# Patient Record
Sex: FEMALE | Race: WHITE | Employment: UNEMPLOYED | URBAN - METROPOLITAN AREA
[De-identification: names, ages, dates, MRNs, and addresses within clinical notes are randomized per-mention and may not be internally consistent; named-entity substitution may affect disease eponyms.]

---

## 2017-08-18 ENCOUNTER — HOSPITAL ENCOUNTER (OUTPATIENT)
Dept: ULTRASOUND IMAGING | Age: 62
Discharge: HOME | End: 2017-08-18
Payer: COMMERCIAL

## 2017-08-18 DIAGNOSIS — R63.4 WEIGHT LOSS: ICD-10-CM

## 2017-08-18 DIAGNOSIS — R10.12 LUQ PAIN: ICD-10-CM

## 2017-08-18 PROCEDURE — 76705 ECHO EXAM OF ABDOMEN: CPT

## 2017-12-01 ENCOUNTER — HOSPITAL ENCOUNTER (OUTPATIENT)
Dept: CT IMAGING | Age: 62
Discharge: HOME | End: 2017-12-01
Payer: COMMERCIAL

## 2017-12-01 DIAGNOSIS — C73 THYROID CANCER (HCC): ICD-10-CM

## 2017-12-01 DIAGNOSIS — E03.9 HYPOTHYROIDISM, UNSPECIFIED TYPE: ICD-10-CM

## 2017-12-01 DIAGNOSIS — J32.9 CHRONIC SINUSITIS, UNSPECIFIED LOCATION: ICD-10-CM

## 2017-12-01 PROCEDURE — 70490 CT SOFT TISSUE NECK W/O DYE: CPT

## 2017-12-01 PROCEDURE — 70486 CT MAXILLOFACIAL W/O DYE: CPT

## 2017-12-19 ENCOUNTER — HOSPITAL ENCOUNTER (OUTPATIENT)
Dept: WOMENS IMAGING | Age: 62
Discharge: HOME | End: 2017-12-19
Payer: COMMERCIAL

## 2017-12-19 DIAGNOSIS — R92.8 ABNORMAL MAMMOGRAM: ICD-10-CM

## 2017-12-19 DIAGNOSIS — N64.4 PAIN OF RIGHT BREAST: ICD-10-CM

## 2017-12-19 DIAGNOSIS — N64.4 BREAST PAIN: ICD-10-CM

## 2017-12-19 PROCEDURE — 76642 ULTRASOUND BREAST LIMITED: CPT

## 2017-12-19 PROCEDURE — G0279 TOMOSYNTHESIS, MAMMO: HCPCS

## 2017-12-26 ENCOUNTER — HOSPITAL ENCOUNTER (OUTPATIENT)
Dept: WOMENS IMAGING | Age: 62
Discharge: HOME | End: 2017-12-26
Payer: COMMERCIAL

## 2017-12-26 VITALS — SYSTOLIC BLOOD PRESSURE: 113 MMHG | HEART RATE: 70 BPM | DIASTOLIC BLOOD PRESSURE: 70 MMHG

## 2017-12-26 DIAGNOSIS — R92.8 ABNORMAL MAMMOGRAM: ICD-10-CM

## 2017-12-26 PROCEDURE — 19083 BX BREAST 1ST LESION US IMAG: CPT

## 2017-12-26 PROCEDURE — 88305 TISSUE EXAM BY PATHOLOGIST: CPT

## 2017-12-27 LAB — SURGICAL PATHOLOGY REPORT: NORMAL

## 2019-03-04 ENCOUNTER — HOSPITAL ENCOUNTER (EMERGENCY)
Age: 64
Discharge: HOME | End: 2019-03-05
Attending: EMERGENCY MEDICINE
Payer: COMMERCIAL

## 2019-03-04 VITALS
HEART RATE: 74 BPM | SYSTOLIC BLOOD PRESSURE: 152 MMHG | OXYGEN SATURATION: 99 % | HEIGHT: 67 IN | TEMPERATURE: 97.9 F | RESPIRATION RATE: 16 BRPM | WEIGHT: 130 LBS | DIASTOLIC BLOOD PRESSURE: 62 MMHG | BODY MASS INDEX: 20.4 KG/M2

## 2019-03-04 DIAGNOSIS — L03.116 CELLULITIS OF LEFT LOWER EXTREMITY: Primary | ICD-10-CM

## 2019-03-04 PROCEDURE — 87040 BLOOD CULTURE FOR BACTERIA: CPT

## 2019-03-04 PROCEDURE — 85025 COMPLETE CBC W/AUTO DIFF WBC: CPT

## 2019-03-04 PROCEDURE — 6370000000 HC RX 637 (ALT 250 FOR IP): Performed by: EMERGENCY MEDICINE

## 2019-03-04 PROCEDURE — 93971 EXTREMITY STUDY: CPT

## 2019-03-04 PROCEDURE — 99283 EMERGENCY DEPT VISIT LOW MDM: CPT

## 2019-03-04 PROCEDURE — 86140 C-REACTIVE PROTEIN: CPT

## 2019-03-04 RX ORDER — KETOROLAC TROMETHAMINE 30 MG/ML
15 INJECTION, SOLUTION INTRAMUSCULAR; INTRAVENOUS ONCE
Status: DISCONTINUED | OUTPATIENT
Start: 2019-03-04 | End: 2019-03-05 | Stop reason: HOSPADM

## 2019-03-04 RX ORDER — IBUPROFEN 800 MG/1
800 TABLET ORAL ONCE
Status: COMPLETED | OUTPATIENT
Start: 2019-03-04 | End: 2019-03-04

## 2019-03-04 RX ADMIN — IBUPROFEN 800 MG: 800 TABLET ORAL at 23:05

## 2019-03-04 ASSESSMENT — PAIN DESCRIPTION - PAIN TYPE: TYPE: ACUTE PAIN

## 2019-03-04 ASSESSMENT — PAIN DESCRIPTION - LOCATION: LOCATION: ANKLE;FOOT;LEG

## 2019-03-04 ASSESSMENT — PAIN DESCRIPTION - DESCRIPTORS: DESCRIPTORS: ACHING;BURNING

## 2019-03-04 ASSESSMENT — PAIN SCALES - GENERAL
PAINLEVEL_OUTOF10: 5
PAINLEVEL_OUTOF10: 5

## 2019-03-05 LAB
ABSOLUTE EOS #: 0.1 K/UL (ref 0–0.4)
ABSOLUTE IMMATURE GRANULOCYTE: ABNORMAL K/UL (ref 0–0.3)
ABSOLUTE LYMPH #: 0.6 K/UL (ref 1–4.8)
ABSOLUTE MONO #: 0.4 K/UL (ref 0.1–1.3)
BASOPHILS # BLD: 0 % (ref 0–2)
BASOPHILS ABSOLUTE: 0 K/UL (ref 0–0.2)
C-REACTIVE PROTEIN: 15.2 MG/L (ref 0–5)
DIFFERENTIAL TYPE: ABNORMAL
EOSINOPHILS RELATIVE PERCENT: 2 % (ref 0–4)
HCT VFR BLD CALC: 39.1 % (ref 36–46)
HEMOGLOBIN: 13.4 G/DL (ref 12–16)
IMMATURE GRANULOCYTES: ABNORMAL %
LYMPHOCYTES # BLD: 15 % (ref 24–44)
MCH RBC QN AUTO: 31.1 PG (ref 26–34)
MCHC RBC AUTO-ENTMCNC: 34.2 G/DL (ref 31–37)
MCV RBC AUTO: 90.9 FL (ref 80–100)
MONOCYTES # BLD: 10 % (ref 1–7)
NRBC AUTOMATED: ABNORMAL PER 100 WBC
PDW BLD-RTO: 13 % (ref 11.5–14.9)
PLATELET # BLD: 145 K/UL (ref 150–450)
PLATELET ESTIMATE: ABNORMAL
PMV BLD AUTO: 10.7 FL (ref 6–12)
RBC # BLD: 4.3 M/UL (ref 4–5.2)
RBC # BLD: ABNORMAL 10*6/UL
SEG NEUTROPHILS: 73 % (ref 36–66)
SEGMENTED NEUTROPHILS ABSOLUTE COUNT: 3 K/UL (ref 1.3–9.1)
WBC # BLD: 4.2 K/UL (ref 3.5–11)
WBC # BLD: ABNORMAL 10*3/UL

## 2019-03-05 RX ORDER — LEVOTHYROXINE SODIUM 112 UG/1
112 TABLET ORAL DAILY
COMMUNITY
End: 2019-06-21 | Stop reason: ALTCHOICE

## 2019-03-05 RX ORDER — CEPHALEXIN 500 MG/1
500 CAPSULE ORAL 4 TIMES DAILY
Qty: 28 CAPSULE | Refills: 0 | Status: SHIPPED | OUTPATIENT
Start: 2019-03-05 | End: 2019-03-12

## 2019-03-05 ASSESSMENT — ENCOUNTER SYMPTOMS
VOMITING: 0
EYE PAIN: 0
PHOTOPHOBIA: 0
NAUSEA: 0
ABDOMINAL PAIN: 0
DIARRHEA: 0
COUGH: 0
RHINORRHEA: 0
EYE DISCHARGE: 0
COLOR CHANGE: 1
SHORTNESS OF BREATH: 0

## 2019-03-05 ASSESSMENT — PAIN SCALES - GENERAL: PAINLEVEL_OUTOF10: 3

## 2019-03-11 LAB
CULTURE: NORMAL
Lab: NORMAL
SPECIMEN DESCRIPTION: NORMAL

## 2019-03-13 ENCOUNTER — HOSPITAL ENCOUNTER (OUTPATIENT)
Dept: GENERAL RADIOLOGY | Age: 64
Discharge: HOME | End: 2019-03-15
Payer: COMMERCIAL

## 2019-03-13 ENCOUNTER — HOSPITAL ENCOUNTER (OUTPATIENT)
Age: 64
Discharge: HOME | End: 2019-03-15
Payer: COMMERCIAL

## 2019-03-13 DIAGNOSIS — R52 PAIN: ICD-10-CM

## 2019-03-13 PROCEDURE — 73630 X-RAY EXAM OF FOOT: CPT

## 2019-03-13 PROCEDURE — 73610 X-RAY EXAM OF ANKLE: CPT

## 2019-06-21 ENCOUNTER — INITIAL CONSULT (OUTPATIENT)
Dept: ONCOLOGY | Age: 64
End: 2019-06-21
Payer: COMMERCIAL

## 2019-06-21 ENCOUNTER — TELEPHONE (OUTPATIENT)
Dept: ONCOLOGY | Age: 64
End: 2019-06-21

## 2019-06-21 VITALS
HEIGHT: 66 IN | SYSTOLIC BLOOD PRESSURE: 135 MMHG | HEART RATE: 62 BPM | TEMPERATURE: 98 F | DIASTOLIC BLOOD PRESSURE: 75 MMHG | WEIGHT: 132.9 LBS | BODY MASS INDEX: 21.36 KG/M2

## 2019-06-21 DIAGNOSIS — D72.810 LYMPHOPENIA: Primary | ICD-10-CM

## 2019-06-21 DIAGNOSIS — R59.1 LYMPHADENOPATHY: ICD-10-CM

## 2019-06-21 PROCEDURE — 99245 OFF/OP CONSLTJ NEW/EST HI 55: CPT | Performed by: INTERNAL MEDICINE

## 2019-06-21 PROCEDURE — 99201 HC NEW PT, E/M LEVEL 1: CPT | Performed by: INTERNAL MEDICINE

## 2019-06-21 RX ORDER — LEVOTHYROXINE SODIUM 112 UG/1
CAPSULE ORAL DAILY
COMMUNITY

## 2019-06-21 SDOH — HEALTH STABILITY: MENTAL HEALTH: HOW OFTEN DO YOU HAVE A DRINK CONTAINING ALCOHOL?: NEVER

## 2019-06-21 NOTE — PROGRESS NOTES
fever or chills. · Eyes: No blurred vision, eye pain or double vision. · Ears: No hearing problems or drainage. No tinnitus. · Throat: No sore throat, problems with swallowing or dysphagia. · Respiratory: No cough, sputum or hemoptysis. No shortness of breath. No pleuritic chest pain. · Cardiovascular: No chest pain, orthopnea or PND. No lower extremity edema. No palpitation. · Gastrointestinal: No problems with swallowing. No abdominal pain or bloating. No nausea or vomiting. No diarrhea or constipation. No GI bleeding. · Genitourinary: No dysuria, hematuria, frequency or urgency. · Musculoskeletal: No muscle aches or pains. No limitation of movement. No back pain. No gait disturbance, No joint complaints. · Dermatologic: No skin rashes or pruritus. No skin lesions or discolorations. · Psychiatric: No depression, anxiety, or stress or signs of schizophrenia. No change in mood or affect. · Hematologic: No history of bleeding tendency. No bruises or ecchymosis. No history of clotting problems. · Infectious disease: No fever, chills or frequent infections. · Endocrine:  No polydipsia or polyuria. No temperature intolerance. · Neurologic: No headaches or dizziness. No weakness or numbness of the extremities. No changes in balance, coordination,  memory, mentation, behavior. · Allergic/Immunologic: No nasal congestion or hives. No repeated infections. PHYSICAL EXAM:  The patient is not in acute distress. Vital signs: Blood pressure 135/75, pulse 62, temperature 98 °F (36.7 °C), temperature source Oral, height 5' 6\" (1.676 m), weight 132 lb 14.4 oz (60.3 kg). HEENT:  Eyes are normal. Ears, nose and throat are normal.  Neck: Supple. No lymph node enlargement. Status post thyroidectomy. .  Trachea is centrally located. Chest:  Clear to auscultation. No wheezes or crepitations. Heart: Regular sinus rhythm. Abdomen: Mild tenderness in the left inguinal area. Site of biopsy.   No palpable lymph nodes. No hepatosplenomegaly. No masses. Extremities:  With no edema. Lymph Nodes:  No cervical, axillary or inguinal lymph node enlargement. Neurologic:  Conscious and oriented. No focal neurological deficits. Psychosocial: No depression, anxiety or stress. Skin: No rashes, bruises or ecchymoses. Review of Diagnostic data:   Lab Results   Component Value Date    WBC 4.2 03/04/2019    HGB 13.4 03/04/2019    HCT 39.1 03/04/2019    MCV 90.9 03/04/2019     (L) 03/04/2019         IMPRESSION:   Mild leukopenia. History of left inguinal lymphadenopathy. Status post biopsy. Negative for malignancy. Thyroid cancer status post thyroidectomy. In remission. PLAN: I reviewed the labs available to me and discussed with the patient. For more than 60 minutes of face to face discussion, I explained to the patient the nature of this hematologic problem. I explained the significance of these abnormalities in layman language. I reviewed labs and recent pathology results from the lymph node biopsy. Explained to the patient. No evidence of malignancy. However needle biopsy is not conclusive. I explained to the patient that excisional biopsy or incisional biopsy of lymph node will be more informative if malignancy is suspected. However she could be having reactive lymph node enlargements. Before making further recommendations we would like to do CT scan of the abdomen and pelvis for evaluation of possible lymphadenopathy. If suspicious patient would be referred to surgery for lymph node excisional or incisional biopsy. At the same time I will check LDH and flow cytometry and will repeat CBC today. We will make further recommendations based on the results. Patient's questions were answered to the best of her satisfaction and she verbalized full understanding and agreement.

## 2019-06-21 NOTE — TELEPHONE ENCOUNTER
Dr Denise Carvajal requested writer to obtain LN biopsy result performed earlier this week @ [facility]. Writer called [facility] path dept and spoke with Vaishali. Vaishali faxed report. Copy of report given to Dr Denise Carvajal to address with patient at appt today and other copy placed in pile to be scanned.  Navin Hargrove

## 2019-06-21 NOTE — LETTER
_    Pam Escalona MD    2019     Dorothy Vogel MD  [id number]    Dear Dr Darron Robles: Thank you for referring Mena Sykes, 1955, to me for evaluation. Below are the relevant portions of my assessment and plan of care. Ms. Gaby Henao is a very pleasant 61 y.o. female with history of multiple comorbidities as listed. Patient had recent diagnosis of thyroid cancer. Status post thyroidectomy. He follows up with endocrinology. Patient had pain in the left groin region for about 1 year. She started having swelling in the left lower extremity in 2019. She had Doppler ultrasound of the left leg which showed no DVT. She had left inguinal lymph node enlargement. Patient had recent needle biopsy performed last week. Negative for malignancy. Patient denies any fever or night sweating. No other palpable lymph nodes. No weight loss or decreased appetite. Lab testing showed mild leukopenia. Patient was referred for evaluation. No history of smoking or alcohol drinking. PAST MEDICAL HISTORY: has a past medical history of Mold exposure and Thyroid cancer ([facility] [address] PAST SURGICAL HISTORY: has a past surgical history that includes Cholecystectomy; Total Thyroidectomy (); Tonsillectomy and adenoidectomy;  section; knee surgery (Left, ); Hysterectomy, total abdominal (); and bladder suspension (). CURRENT MEDICATIONS:  has a current medication list which includes the following prescription(s): levothyroxine sodium, liotrix (t3-t4), and vitamin d. ALLERGIES:  is allergic to latex; shellfish allergy; sulfa antibiotics; codeine; erythromycin; and penicillins. FAMILY HISTORY: Mother had non-Hodgkin's lymphoma. Sister had cancer in the sternum. Otherwise negative for any hematological or oncological conditions. SOCIAL HISTORY:  reports that she has never smoked.  She has never used smokeless tobacco. She reports that she does not drink alcohol or use drugs. REVIEW OF SYSTEMS:     · General: Positive for weakness and fatigue. . No unanticipated weight loss or decreased appetite. No fever or chills. · Eyes: No blurred vision, eye pain or double vision. · Ears: No hearing problems or drainage. No tinnitus. · Throat: No sore throat, problems with swallowing or dysphagia. · Respiratory: No cough, sputum or hemoptysis. No shortness of breath. No pleuritic chest pain. · Cardiovascular: No chest pain, orthopnea or PND. No lower extremity edema. No palpitation. · Gastrointestinal: No problems with swallowing. No abdominal pain or bloating. No nausea or vomiting. No diarrhea or constipation. No GI bleeding. · Genitourinary: No dysuria, hematuria, frequency or urgency. · Musculoskeletal: No muscle aches or pains. No limitation of movement. No back pain. No gait disturbance, No joint complaints. · Dermatologic: No skin rashes or pruritus. No skin lesions or discolorations. · Psychiatric: No depression, anxiety, or stress or signs of schizophrenia. No change in mood or affect. · Hematologic: No history of bleeding tendency. No bruises or ecchymosis. No history of clotting problems. · Infectious disease: No fever, chills or frequent infections. · Endocrine:  No polydipsia or polyuria. No temperature intolerance. · Neurologic: No headaches or dizziness. No weakness or numbness of the extremities. No changes in balance, coordination,  memory, mentation, behavior. · Allergic/Immunologic: No nasal congestion or hives. No repeated infections. PHYSICAL EXAM:  The patient is not in acute distress. Vital signs: Blood pressure 135/75, pulse 62, temperature 98 °F (36.7 °C), temperature source Oral, height 5' 6\" (1.676 m), weight 132 lb 14.4 oz (60.3 kg). HEENT:  Eyes are normal. Ears, nose and throat are normal.  Neck: Supple.   No based on the results. Patient's questions were answered to the best of her satisfaction and she verbalized full understanding and agreement. If you have questions, please do not hesitate to call me. I look forward to following Tem[facility] along with you. Sincerely,    Pablito Lim MD  Cell: [phone]    This note is created with the assistance of a speech recognition program.  While intending to generate a document that actually reflects the content of the visit, the document can still have some errors including those of syntax and sound a like substitutions which may escape proof reading. It such instances, actual meaning can be extrapolated by contextual diversion.

## 2019-06-27 ENCOUNTER — HOSPITAL ENCOUNTER (OUTPATIENT)
Dept: CT IMAGING | Age: 64
Discharge: HOME | End: 2019-06-29
Payer: COMMERCIAL

## 2019-06-27 ENCOUNTER — HOSPITAL ENCOUNTER (OUTPATIENT)
Age: 64
Discharge: HOME | End: 2019-06-27
Payer: COMMERCIAL

## 2019-06-27 DIAGNOSIS — D72.810 LYMPHOPENIA: ICD-10-CM

## 2019-06-27 DIAGNOSIS — R59.1 LYMPHADENOPATHY: ICD-10-CM

## 2019-06-27 LAB
ABSOLUTE EOS #: 0.1 K/UL (ref 0–0.4)
ABSOLUTE IMMATURE GRANULOCYTE: ABNORMAL K/UL (ref 0–0.3)
ABSOLUTE LYMPH #: 1 K/UL (ref 1–4.8)
ABSOLUTE MONO #: 0.4 K/UL (ref 0.1–1.3)
BASOPHILS # BLD: 0 % (ref 0–2)
BASOPHILS ABSOLUTE: 0 K/UL (ref 0–0.2)
DIFFERENTIAL TYPE: ABNORMAL
EOSINOPHILS RELATIVE PERCENT: 3 % (ref 0–4)
HCT VFR BLD CALC: 38.6 % (ref 36–46)
HEMOGLOBIN: 13 G/DL (ref 12–16)
IMMATURE GRANULOCYTES: ABNORMAL %
LACTATE DEHYDROGENASE: 203 U/L (ref 135–214)
LYMPHOCYTES # BLD: 24 % (ref 24–44)
MCH RBC QN AUTO: 30.8 PG (ref 26–34)
MCHC RBC AUTO-ENTMCNC: 33.7 G/DL (ref 31–37)
MCV RBC AUTO: 91.2 FL (ref 80–100)
MONOCYTES # BLD: 10 % (ref 1–7)
NRBC AUTOMATED: ABNORMAL PER 100 WBC
PDW BLD-RTO: 13.6 % (ref 11.5–14.9)
PLATELET # BLD: 150 K/UL (ref 150–450)
PLATELET ESTIMATE: ABNORMAL
PMV BLD AUTO: 9.9 FL (ref 6–12)
RBC # BLD: 4.24 M/UL (ref 4–5.2)
RBC # BLD: ABNORMAL 10*6/UL
SEG NEUTROPHILS: 63 % (ref 36–66)
SEGMENTED NEUTROPHILS ABSOLUTE COUNT: 2.6 K/UL (ref 1.3–9.1)
WBC # BLD: 4.1 K/UL (ref 3.5–11)
WBC # BLD: ABNORMAL 10*3/UL

## 2019-06-27 PROCEDURE — 88185 FLOWCYTOMETRY/TC ADD-ON: CPT

## 2019-06-27 PROCEDURE — 88184 FLOWCYTOMETRY/ TC 1 MARKER: CPT

## 2019-06-27 PROCEDURE — 83615 LACTATE (LD) (LDH) ENZYME: CPT

## 2019-06-27 PROCEDURE — 74176 CT ABD & PELVIS W/O CONTRAST: CPT

## 2019-06-27 PROCEDURE — 85025 COMPLETE CBC W/AUTO DIFF WBC: CPT

## 2019-06-27 PROCEDURE — 36415 COLL VENOUS BLD VENIPUNCTURE: CPT

## 2019-06-30 LAB — SURGICAL PATHOLOGY REPORT: NORMAL

## 2019-07-01 ENCOUNTER — TELEPHONE (OUTPATIENT)
Dept: ONCOLOGY | Age: 64
End: 2019-07-01

## 2019-07-01 ENCOUNTER — OFFICE VISIT (OUTPATIENT)
Dept: ONCOLOGY | Age: 64
End: 2019-07-01
Payer: COMMERCIAL

## 2019-07-01 VITALS
SYSTOLIC BLOOD PRESSURE: 137 MMHG | TEMPERATURE: 98.6 F | DIASTOLIC BLOOD PRESSURE: 84 MMHG | WEIGHT: 132.4 LBS | HEART RATE: 63 BPM | BODY MASS INDEX: 21.37 KG/M2

## 2019-07-01 DIAGNOSIS — R59.1 LYMPHADENOPATHY: Primary | ICD-10-CM

## 2019-07-01 DIAGNOSIS — D72.810 LYMPHOPENIA: ICD-10-CM

## 2019-07-01 LAB — FLOW CYTOMETRY BL: NORMAL

## 2019-07-01 PROCEDURE — 99214 OFFICE O/P EST MOD 30 MIN: CPT | Performed by: INTERNAL MEDICINE

## 2019-07-01 PROCEDURE — 99211 OFF/OP EST MAY X REQ PHY/QHP: CPT

## 2019-07-01 NOTE — PROGRESS NOTES
_           Chief Complaint   Patient presents with    Follow-up     Review status of disease     Results     CT  / Labs      DIAGNOSIS:       Mild leukopenia. Resolved  History of left inguinal lymphadenopathy. Status post biopsy. Negative for malignancy. Resolved  Thyroid cancer status post thyroidectomy. In remission. CURRENT THERAPY:         Patient seen discussed results of the labs and CT scan and further management plans  BRIEF CASE HISTORY:      Ms. Ramandeep Santos is a very pleasant 61 y.o. female with history of multiple comorbidities as listed. Patient had recent diagnosis of thyroid cancer. Status post thyroidectomy. He follows up with endocrinology. Patient had pain in the left groin region for about 1 year. She started having swelling in the left lower extremity in 2019. She had Doppler ultrasound of the left leg which showed no DVT. She had left inguinal lymph node enlargement. Patient had recent needle biopsy performed last week. Negative for malignancy. Patient denies any fever or night sweating. No other palpable lymph nodes. No weight loss or decreased appetite. Lab testing showed mild leukopenia. Patient was referred for evaluation. No history of smoking or alcohol drinking. INTERIM HISTORY:   The patient is seen for follow-up lab tests and CT scan for a variation of left inguinal lymphadenopathy. Clinically stable. No new events. No fever or night sweats. No enlarged lymph nodes. PAST MEDICAL HISTORY: has a past medical history of Mold exposure and Thyroid cancer ([facility] [address] PAST SURGICAL HISTORY: has a past surgical history that includes Cholecystectomy; Total Thyroidectomy (); Tonsillectomy and adenoidectomy;  section; knee surgery (Left, ); Hysterectomy, total abdominal (); and bladder suspension ().      CURRENT MEDICATIONS:  has a current medication list which includes the following prescription(s): levothyroxine sodium, liotrix (t3-t4), and vitamin d. ALLERGIES:  is allergic to latex; shellfish allergy; sulfa antibiotics; codeine; erythromycin; and penicillins. FAMILY HISTORY: Mother had non-Hodgkin's lymphoma. Sister had cancer in the sternum. Otherwise negative for any hematological or oncological conditions. SOCIAL HISTORY:  reports that she has never smoked. She has never used smokeless tobacco. She reports that she does not drink alcohol or use drugs. REVIEW OF SYSTEMS:     · General: Positive for weakness and fatigue. . No unanticipated weight loss or decreased appetite. No fever or chills. · Eyes: No blurred vision, eye pain or double vision. · Ears: No hearing problems or drainage. No tinnitus. · Throat: No sore throat, problems with swallowing or dysphagia. · Respiratory: No cough, sputum or hemoptysis. No shortness of breath. No pleuritic chest pain. · Cardiovascular: No chest pain, orthopnea or PND. No lower extremity edema. No palpitation. · Gastrointestinal: No problems with swallowing. No abdominal pain or bloating. No nausea or vomiting. No diarrhea or constipation. No GI bleeding. · Genitourinary: No dysuria, hematuria, frequency or urgency. · Musculoskeletal: No muscle aches or pains. No limitation of movement. No back pain. No gait disturbance, No joint complaints. · Dermatologic: No skin rashes or pruritus. No skin lesions or discolorations. · Psychiatric: No depression, anxiety, or stress or signs of schizophrenia. No change in mood or affect. · Hematologic: No history of bleeding tendency. No bruises or ecchymosis. No history of clotting problems. · Infectious disease: No fever, chills or frequent infections. · Endocrine:  No polydipsia or polyuria. No temperature intolerance. · Neurologic: No headaches or dizziness. No weakness or numbness of the extremities.  No changes in

## 2020-03-13 ENCOUNTER — TELEPHONE (OUTPATIENT)
Dept: GASTROENTEROLOGY | Age: 65
End: 2020-03-13

## 2023-01-19 ENCOUNTER — TRANSCRIBE ORDERS (OUTPATIENT)
Dept: ADMINISTRATIVE | Age: 68
End: 2023-01-19

## 2023-01-19 DIAGNOSIS — R31.0 GROSS HEMATURIA: Primary | ICD-10-CM

## 2023-01-20 ENCOUNTER — HOSPITAL ENCOUNTER (OUTPATIENT)
Dept: CT IMAGING | Age: 68
Discharge: HOME | End: 2023-01-20
Payer: COMMERCIAL

## 2023-01-20 DIAGNOSIS — R31.0 GROSS HEMATURIA: ICD-10-CM

## 2023-01-20 PROCEDURE — 74176 CT ABD & PELVIS W/O CONTRAST: CPT

## 2023-01-26 ENCOUNTER — TELEPHONE (OUTPATIENT)
Dept: ONCOLOGY | Age: 68
End: 2023-01-26

## 2023-01-26 NOTE — TELEPHONE ENCOUNTER
PATIENT CALLED AND LEFT A VM WANTING TO GET SCHEDULED WITH DR. Kisha Vuong AT [facility], AS SHE CAN NOT GET AN APPOINTMENT AT [facility] UNTIL February. WRITER TRIED CALLING PATIENT BACK, BUT HAD TO LEAVE A VM THAT WE HAVE AN OPENING February 7TH, 2023.

## 2023-02-06 ENCOUNTER — OFFICE VISIT (OUTPATIENT)
Dept: ONCOLOGY | Age: 68
End: 2023-02-06
Payer: COMMERCIAL

## 2023-02-06 VITALS
TEMPERATURE: 97 F | HEART RATE: 76 BPM | BODY MASS INDEX: 23.98 KG/M2 | SYSTOLIC BLOOD PRESSURE: 134 MMHG | WEIGHT: 148.6 LBS | DIASTOLIC BLOOD PRESSURE: 73 MMHG

## 2023-02-06 DIAGNOSIS — R59.1 LYMPHADENOPATHY: ICD-10-CM

## 2023-02-06 DIAGNOSIS — D70.9 NEUTROPENIA, UNSPECIFIED TYPE (HCC): ICD-10-CM

## 2023-02-06 DIAGNOSIS — R31.0 GROSS HEMATURIA: Primary | ICD-10-CM

## 2023-02-06 PROCEDURE — 99205 OFFICE O/P NEW HI 60 MIN: CPT | Performed by: INTERNAL MEDICINE

## 2023-02-06 NOTE — PROGRESS NOTES
_           Chief Complaint   Patient presents with    Follow-up     Review status of disease    Other     Have been finding blood in my urine for over a week and went to urology and not comfortable with that MD no one has done blood work, My Primary care physician  has not been in the office due to emergency so I haven't seen anyone for help     Pain     Abdomen feels like theres a lot of pulling      DIAGNOSIS:       Mild leukopenia. Resolved  History of left inguinal lymphadenopathy. Status post biopsy. Negative for malignancy. Resolved  Thyroid cancer status post thyroidectomy. In remission. Recent episode of gross hematuria     CURRENT THERAPY:         Patient seen discussed results of the labs and CT scan and further management plans  Further management for hematuria by urology. BRIEF CASE HISTORY:      Ms. Chayito Michael is a very pleasant 79 y.o. female with history of multiple comorbidities as listed. Patient had recent diagnosis of thyroid cancer. Status post thyroidectomy. He follows up with endocrinology. Patient had pain in the left groin region for about 1 year. She started having swelling in the left lower extremity in March 2019. She had Doppler ultrasound of the left leg which showed no DVT. She had left inguinal lymph node enlargement. Patient had recent needle biopsy performed last week. Negative for malignancy. Patient denies any fever or night sweating. No other palpable lymph nodes. No weight loss or decreased appetite. Lab testing showed mild leukopenia. Patient was referred for evaluation. No history of smoking or alcohol drinking. INTERIM HISTORY:   Patient was last seen in our office in July 2019. At that time she was seen because of leukopenia and inguinal lymphadenopathy which has resolved.   Patient did not have any problems related to lymph nodes or vital signs or any signs or symptoms of fever or infections. No lymphadenopathy. No weight loss or decreased appetite. Patient had recent episode of gross hematuria. She was evaluated by her PCP and subsequently by urology. Patient is scheduled for procedure, likely cystoscopy. Patient has no abdominal pain. No pain of the flanks. No fever. No vomiting. No other complaints. PAST MEDICAL HISTORY: has a past medical history of Mold exposure and Thyroid cancer ([facility] [address] PAST SURGICAL HISTORY: has a past surgical history that includes Cholecystectomy; Total Thyroidectomy (); Tonsillectomy and adenoidectomy;  section; knee surgery (Left, ); Hysterectomy, total abdominal (); and bladder suspension (). CURRENT MEDICATIONS:  has a current medication list which includes the following prescription(s): levothyroxine sodium, liotrix (t3-t4), and vitamin d. ALLERGIES:  is allergic to latex, shellfish allergy, sulfa antibiotics, codeine, erythromycin, and penicillins. FAMILY HISTORY: Mother had non-Hodgkin's lymphoma. Sister had cancer in the sternum. Otherwise negative for any hematological or oncological conditions. SOCIAL HISTORY:  reports that she has never smoked. She has never used smokeless tobacco. She reports that she does not drink alcohol and does not use drugs. REVIEW OF SYSTEMS:     General: Positive for weakness and fatigue. . No unanticipated weight loss or decreased appetite. No fever or chills. Eyes: No blurred vision, eye pain or double vision. Ears: No hearing problems or drainage. No tinnitus. Throat: No sore throat, problems with swallowing or dysphagia. Respiratory: No cough, sputum or hemoptysis. No shortness of breath. No pleuritic chest pain. Cardiovascular: No chest pain, orthopnea or PND. No lower extremity edema. No palpitation. Gastrointestinal: No problems with swallowing. No abdominal pain or bloating. No nausea or vomiting.  No diarrhea or constipation. No GI bleeding. Genitourinary: As above. Musculoskeletal: No muscle aches or pains. No limitation of movement. No back pain. No gait disturbance, No joint complaints. Dermatologic: No skin rashes or pruritus. No skin lesions or discolorations. Psychiatric: No depression, anxiety, or stress or signs of schizophrenia. No change in mood or affect. Hematologic: No history of bleeding tendency. No bruises or ecchymosis. No history of clotting problems. Infectious disease: No fever, chills or frequent infections. Endocrine:  No polydipsia or polyuria. No temperature intolerance. Neurologic: No headaches or dizziness. No weakness or numbness of the extremities. No changes in balance, coordination,  memory, mentation, behavior. Allergic/Immunologic: No nasal congestion or hives. No repeated infections. PHYSICAL EXAM:  The patient is not in acute distress. Vital signs: Blood pressure 134/73, pulse 76, temperature 97 °F (36.1 °C), temperature source Temporal, weight 148 lb 9.6 oz (67.4 kg). HEENT:  Eyes are normal. Ears, nose and throat are normal.  Neck: Supple. No lymph node enlargement. Status post thyroidectomy. .  Trachea is centrally located. Chest:  Clear to auscultation. No wheezes or crepitations. Heart: Regular sinus rhythm. Abdomen: Mild tenderness in the left inguinal area. Site of biopsy. No palpable lymph nodes. No hepatosplenomegaly. No masses. Extremities:  With no edema. Lymph Nodes:  No cervical, axillary or inguinal lymph node enlargement. Neurologic:  Conscious and oriented. No focal neurological deficits. Psychosocial: No depression, anxiety or stress. Skin: No rashes, bruises or ecchymoses.       Review of Diagnostic data:   Lab Results   Component Value Date    WBC 4.1 06/27/2019    HGB 13.0 06/27/2019    HCT 38.6 06/27/2019    MCV 91.2 06/27/2019     06/27/2019       Flow cytometry is negative  LDH normal    CT scan of the abdomen and pelvis showed no lesions and no lymphadenopathy    IMPRESSION:   Mild leukopenia. Resolved  History of left inguinal lymphadenopathy. Status post biopsy. Negative for malignancy. Resolved  Thyroid cancer status post thyroidectomy. In remission. Recent episode of gross hematuria    PLAN: I reviewed the labs and CT scan results and discussed with the patient. I explained to the patient the nature of this hematologic problem. I explained the significance of these abnormalities in layman language. I reviewed labs and recent pathology results from the lymph node biopsy. Explained to the patient. Labs and CT scan showed no significant pathology. Likely dealing with reactive problems. No palpable lymph nodes and no enlarged lymph nodes on the CT scan. No role for biopsy. Labs including flow cytometry is normal.  No need for bone marrow test or further hematologic work-up. Discussed and explained to the patient there is a problem with hematuria and possible causes and management plans. Obviously she was seen by her PCP and by urology. I explained the importance of follow-up on this problem and having the procedure done by urology. In the interim I will repeat her CBC with differential and I will have her CMP and urinalysis with reflex culture. I will call her with the results. She will be seen as needed. She will follow with her urologist and PCP soon. Patient's questions were answered to the best of her satisfaction and she verbalized full understanding and agreement.

## 2023-02-08 ENCOUNTER — HOSPITAL ENCOUNTER (OUTPATIENT)
Age: 68
Discharge: HOME | End: 2023-02-08
Payer: COMMERCIAL

## 2023-02-08 DIAGNOSIS — R31.0 GROSS HEMATURIA: ICD-10-CM

## 2023-02-08 PROCEDURE — 81001 URINALYSIS AUTO W/SCOPE: CPT

## 2023-02-08 PROCEDURE — 85025 COMPLETE CBC W/AUTO DIFF WBC: CPT

## 2023-02-08 PROCEDURE — 36415 COLL VENOUS BLD VENIPUNCTURE: CPT

## 2023-02-08 PROCEDURE — 80053 COMPREHEN METABOLIC PANEL: CPT

## 2023-02-09 LAB
ABSOLUTE EOS #: 0.13 K/UL (ref 0–0.44)
ABSOLUTE IMMATURE GRANULOCYTE: <0.03 K/UL (ref 0–0.3)
ABSOLUTE LYMPH #: 1.16 K/UL (ref 1.1–3.7)
ABSOLUTE MONO #: 0.44 K/UL (ref 0.1–1.2)
ALBUMIN SERPL-MCNC: 4.4 G/DL (ref 3.5–5.2)
ALBUMIN/GLOBULIN RATIO: 1.8 (ref 1–2.5)
ALP SERPL-CCNC: 88 U/L (ref 35–104)
ALT SERPL-CCNC: 18 U/L (ref 5–33)
ANION GAP SERPL CALCULATED.3IONS-SCNC: 14 MMOL/L (ref 9–17)
AST SERPL-CCNC: 22 U/L
BASOPHILS # BLD: 1 % (ref 0–2)
BASOPHILS ABSOLUTE: 0.03 K/UL (ref 0–0.2)
BILIRUB SERPL-MCNC: 0.3 MG/DL (ref 0.3–1.2)
BILIRUBIN URINE: NEGATIVE
BUN SERPL-MCNC: 17 MG/DL (ref 8–23)
CALCIUM SERPL-MCNC: 9.1 MG/DL (ref 8.6–10.4)
CHLORIDE SERPL-SCNC: 98 MMOL/L (ref 98–107)
CO2 SERPL-SCNC: 24 MMOL/L (ref 20–31)
COLOR: YELLOW
CREAT SERPL-MCNC: 0.65 MG/DL (ref 0.5–0.9)
EOSINOPHILS RELATIVE PERCENT: 3 % (ref 1–4)
EPITHELIAL CELLS UA: NORMAL /HPF (ref 0–5)
GFR SERPL CREATININE-BSD FRML MDRD: >60 ML/MIN/1.73M2
GLUCOSE SERPL-MCNC: 103 MG/DL (ref 70–99)
GLUCOSE UR STRIP.AUTO-MCNC: NEGATIVE MG/DL
HCT VFR BLD AUTO: 37.8 % (ref 36.3–47.1)
HGB BLD-MCNC: 12.8 G/DL (ref 11.9–15.1)
IMMATURE GRANULOCYTES: 0 %
KETONES UR STRIP.AUTO-MCNC: NEGATIVE MG/DL
LEUKOCYTE ESTERASE UR QL STRIP.AUTO: ABNORMAL
LYMPHOCYTES # BLD: 24 % (ref 24–43)
MCH RBC QN AUTO: 31 PG (ref 25.2–33.5)
MCHC RBC AUTO-ENTMCNC: 33.9 G/DL (ref 28.4–34.8)
MCV RBC AUTO: 91.5 FL (ref 82.6–102.9)
MONOCYTES # BLD: 9 % (ref 3–12)
NITRITE UR QL STRIP.AUTO: NEGATIVE
NRBC AUTOMATED: 0 PER 100 WBC
PDW BLD-RTO: 12.8 % (ref 11.8–14.4)
PLATELET # BLD AUTO: 168 K/UL (ref 138–453)
PMV BLD AUTO: 12.6 FL (ref 8.1–13.5)
POTASSIUM SERPL-SCNC: 4.1 MMOL/L (ref 3.7–5.3)
PROT SERPL-MCNC: 6.9 G/DL (ref 6.4–8.3)
PROT UR STRIP.AUTO-MCNC: 6.5 MG/DL (ref 5–8)
PROT UR STRIP.AUTO-MCNC: NEGATIVE MG/DL
RBC # BLD: 4.13 M/UL (ref 3.95–5.11)
RBC CLUMPS #/AREA URNS AUTO: NORMAL /HPF (ref 0–4)
SEG NEUTROPHILS: 63 % (ref 36–65)
SEGMENTED NEUTROPHILS ABSOLUTE COUNT: 3.06 K/UL (ref 1.5–8.1)
SODIUM SERPL-SCNC: 136 MMOL/L (ref 135–144)
SPECIFIC GRAVITY UA: 1.01 (ref 1–1.03)
TURBIDITY: CLEAR
URINE HGB: NEGATIVE
UROBILINOGEN, URINE: NORMAL
WBC # BLD AUTO: 4.8 K/UL (ref 3.5–11.3)
WBC UA: NORMAL /HPF (ref 0–5)

## 2023-02-13 ENCOUNTER — OFFICE VISIT (OUTPATIENT)
Dept: UROLOGY | Age: 68
End: 2023-02-13
Payer: COMMERCIAL

## 2023-02-13 VITALS
HEIGHT: 66 IN | TEMPERATURE: 98.6 F | SYSTOLIC BLOOD PRESSURE: 132 MMHG | DIASTOLIC BLOOD PRESSURE: 70 MMHG | BODY MASS INDEX: 21.38 KG/M2 | WEIGHT: 133 LBS | HEART RATE: 68 BPM

## 2023-02-13 DIAGNOSIS — R35.0 FREQUENCY OF URINATION: ICD-10-CM

## 2023-02-13 DIAGNOSIS — R31.0 GROSS HEMATURIA: Primary | ICD-10-CM

## 2023-02-13 DIAGNOSIS — R39.15 URGENCY OF URINATION: ICD-10-CM

## 2023-02-13 PROCEDURE — 1123F ACP DISCUSS/DSCN MKR DOCD: CPT | Performed by: UROLOGY

## 2023-02-13 PROCEDURE — 99204 OFFICE O/P NEW MOD 45 MIN: CPT | Performed by: UROLOGY

## 2023-02-13 ASSESSMENT — ENCOUNTER SYMPTOMS
SHORTNESS OF BREATH: 0
ABDOMINAL PAIN: 0
BACK PAIN: 0
WHEEZING: 0
COUGH: 0
DIARRHEA: 0
CONSTIPATION: 0
VOMITING: 0
EYE REDNESS: 0
NAUSEA: 0
EYE PAIN: 0

## 2023-02-13 NOTE — PROGRESS NOTES
[address] [address]  Dept: 92 Melania Gomez [facility] Urology Office Note - New Patient    Patient:  Cheryl Sykes  YOB: 1955  Date: 2/13/2023    The patient is a 79 y.o. female who presentstoday for evaluation of the following problems:   Chief Complaint   Patient presents with    Hematuria    referred by Karley Pichardo. HPI  This is a very pleasant 80-year-old female who is seeing us for gross hematuria. She did have about a 1 week period of gross hematuria last month. Her gross hematuria resolved. She has no family history of urologic malignancies and no personal significant history of smoking. She has had a pelvic reconstruction in 2005. This did involve the use of polypropylene mesh. She does not recall exactly what she had done but she feels that her pelvic organs of prolapsed again. She is having a lot of urgency and frequency. She is not incontinent. She is very bothered by her urinary symptoms. She had a recent noncontrast CT that did not show any concerning findings in her urinary tract. (Patient's old records have been requested, reviewed and summarized in today's note.)    Summary of old records: N/A    History: N/A    ProceduresToday: N/A    Urinalysis today:  No results found for this visit on 02/13/23. AUA Symptom Score (2/13/2023):                                Last BUN andcreatinine:  Lab Results   Component Value Date    BUN 17 02/08/2023     Lab Results   Component Value Date    CREATININE 0.65 02/08/2023       Additional Lab/Culture results: none    Reviewed during this Office Visit: none  (results were independently reviewed byphysician and radiology report verified)    PAST MEDICAL, FAMILY AND SOCIAL HISTORY:  Past Medical History:   Diagnosis Date    Mold exposure     Thyroid cancer [facility])      Past Surgical History:   Procedure Laterality Date    BLADDER SUSPENSION       SECTION      CHOLECYSTECTOMY      HYSTERECTOMY, TOTAL ABDOMINAL (CERVIX REMOVED)      KNEE SURGERY Left     TONSILLECTOMY AND ADENOIDECTOMY      TOTAL THYROIDECTOMY       Family History   Problem Relation Age of Onset    Cancer Mother         Non hodgkins lymphoma    Stroke Father     Cancer Sister      No outpatient medications have been marked as taking for the 23 encounter (Office Visit) with Dmitry Malcolm MD.       Latex, Shellfish allergy, Sulfa antibiotics, Codeine, Erythromycin, and Penicillins  Social History     Tobacco Use   Smoking Status Never   Smokeless Tobacco Never      (If patient a smoker, smoking cessation counseling offered)   Social History     Substance and Sexual Activity   Alcohol Use Never       REVIEW OF SYSTEMS:  Review of Systems    Physical Exam:    This a 79 y.o. female      Vitals:    23 1011   BP: 132/70   Pulse: 68   Temp: 98.6 °F (37 °C)     Body mass index is 21.47 kg/m². Physical Exam  Constitutional: Patient in no acute distress, ggod grooming, appropriately dressed  Neuro: Alert and oriented to person, place and time. Psych:Mood normal, affect normal  Skin: No rash noted  HEENT: Head: Normocephalic and atraumatic,Conjunctivae and EOM are normal,Nose- normal, Right/Left External Ear: normal, Mouth: Mucosa Moist  Neck: Supple  Lungs: Respiratory effort is normal  Cardiovascular: strong and regular, no lower leg edema  Abdomen: Soft, non-tender, non-distended with no CVA,    Lymphatics: No cervical palpable lymphadenopathy. Bladder non-tender and not distended. Musculoskeletal: Normal gait and station        Assessment and Plan      1. Gross hematuria    2. Urgency of urination    3.  Frequency of urination            Plan:   Ctu and cysto for gross hematuria  Pt will need to be premedicated for contrast due to gadolinium allergy  Will address LUTS after above         Prescriptions Ordered:  No orders of the defined types were placed in this encounter. Orders Placed:  Orders Placed This Encounter   Procedures    CT UROGRAM     Standing Status:   Future     Standing Expiration Date:   2/13/2024            Beto Enriquez MD    Agree with the ROS entered by the MA.

## 2023-02-14 ENCOUNTER — HOSPITAL ENCOUNTER (OUTPATIENT)
Dept: ULTRASOUND IMAGING | Age: 68
Discharge: HOME | End: 2023-02-16
Payer: COMMERCIAL

## 2023-02-14 DIAGNOSIS — R31.0 GROSS HEMATURIA: ICD-10-CM

## 2023-02-14 PROCEDURE — 76770 US EXAM ABDO BACK WALL COMP: CPT

## 2023-02-17 ENCOUNTER — TELEPHONE (OUTPATIENT)
Dept: UROLOGY | Age: 68
End: 2023-02-17

## 2023-02-17 NOTE — TELEPHONE ENCOUNTER
Voicemail was left for patient to see if she is able to come in on Monday 02/20/2023 @10A for her cysto.

## 2023-02-20 ENCOUNTER — PROCEDURE VISIT (OUTPATIENT)
Dept: UROLOGY | Age: 68
End: 2023-02-20
Payer: COMMERCIAL

## 2023-02-20 DIAGNOSIS — R10.2 PELVIC PAIN: Primary | ICD-10-CM

## 2023-02-20 DIAGNOSIS — R31.0 GROSS HEMATURIA: Primary | ICD-10-CM

## 2023-02-20 PROCEDURE — 52000 CYSTOURETHROSCOPY: CPT | Performed by: UROLOGY

## 2023-02-20 NOTE — PROGRESS NOTES
Cystoscopy Operative Note (2/20/23): Surgeon: Brie Ko MD  Anesthesia: Urethral 2% Xylocaine  Indications: Gross Hematuria  Position: Dorsal Lithotomy    Findings:   Risks and Benefits discussed with patient prior to procedure. The patient was prepped and draped in the usual sterile fashion. The flexible cystoscope was advanced through the urethra and into the bladder. The bladder was thoroughly inspected and the following was noted:    Vagina: atrophic  Residual Urine: none  Urethra: not indicated  Bladder: No tumors or CIS noted. No bladder diverticulum. There was none trabeculation noted. Ureters: Clear efflux from both ureters. Orifices with normal configuration and location. The cystoscope was removed. The patient tolerated the procedure well. Agree with the ROS entered by the MA. Plan: Refer to Quan Mathis for pelvic floor therapy for pelvic pain. Patient did not have any obvious explanation for her gross hematuria. She was unable to get a contrast-enhanced CT urogram due to contrast allergy. She would like to research holistic ways of making contrast tolerable to her and she says she will call us if she discovers something. Otherwise, I will see her back in 6 months.

## 2023-08-21 ENCOUNTER — OFFICE VISIT (OUTPATIENT)
Dept: UROLOGY | Age: 68
End: 2023-08-21
Payer: COMMERCIAL

## 2023-08-21 VITALS
TEMPERATURE: 97 F | HEIGHT: 66 IN | HEART RATE: 67 BPM | SYSTOLIC BLOOD PRESSURE: 135 MMHG | BODY MASS INDEX: 25.39 KG/M2 | DIASTOLIC BLOOD PRESSURE: 52 MMHG | WEIGHT: 158 LBS

## 2023-08-21 DIAGNOSIS — R10.9 LEFT FLANK PAIN: ICD-10-CM

## 2023-08-21 DIAGNOSIS — N20.0 KIDNEY STONE: ICD-10-CM

## 2023-08-21 DIAGNOSIS — R35.0 FREQUENCY OF URINATION: ICD-10-CM

## 2023-08-21 DIAGNOSIS — R39.15 URGENCY OF URINATION: ICD-10-CM

## 2023-08-21 DIAGNOSIS — R31.0 GROSS HEMATURIA: Primary | ICD-10-CM

## 2023-08-21 PROCEDURE — 1123F ACP DISCUSS/DSCN MKR DOCD: CPT | Performed by: UROLOGY

## 2023-08-21 PROCEDURE — 99214 OFFICE O/P EST MOD 30 MIN: CPT | Performed by: UROLOGY

## 2023-08-21 ASSESSMENT — ENCOUNTER SYMPTOMS
DIARRHEA: 0
CONSTIPATION: 0
NAUSEA: 0
ABDOMINAL PAIN: 1
SHORTNESS OF BREATH: 0
BACK PAIN: 1
WHEEZING: 0
EYE REDNESS: 0
COUGH: 0
EYE PAIN: 0
VOMITING: 0

## 2023-08-21 NOTE — PROGRESS NOTES
Review of Systems   Constitutional:  Negative for chills, fatigue and fever. Eyes:  Negative for pain, redness and visual disturbance. Respiratory:  Negative for cough, shortness of breath and wheezing. Cardiovascular:  Negative for chest pain and leg swelling. Gastrointestinal:  Positive for abdominal pain. Negative for constipation, diarrhea, nausea and vomiting. Genitourinary:  Negative for difficulty urinating, dysuria, flank pain, frequency, hematuria and urgency. Musculoskeletal:  Positive for back pain. Negative for joint swelling and myalgias. Skin:  Negative for rash and wound. Neurological:  Negative for dizziness, tremors, weakness and numbness. Hematological:  Does not bruise/bleed easily.

## 2023-08-21 NOTE — PROGRESS NOTES
[address]  1847 [facility] [address]  Dept: [address] [place] [address] Urology Office Note - Established    Patient:  Laney Jamison  YOB: 1955  Date: 2023    The patient is a 76 y.o. female whopresents today for evaluation of the following problems:   Chief Complaint   Patient presents with    Other     6 months gross hematuria        HPI  This is a 69-year-old female who is seen in the past for gross hematuria. She has had a bladder reconstruction by urogynecologist a few years ago. She feels that her bladder has dropped out again. She is also having tremendous left flank pain. Although she did not have a stone on cross-sectional imaging earlier this year, she had a recent ultrasound done by an ambulatory provider reportedly has a stone in her left kidney. Summary of old records: N/A    Additional History: N/A    Procedures Today: N/A    Urinalysis today:  No results found for this visit on 23. Imaging Reviewed during this Office Visit: none  (results were independently reviewed by physician and radiology report verified)    AUA Symptom Score (2023):                                Last BUN and creatinine:  Lab Results   Component Value Date    BUN 17 2023     Lab Results   Component Value Date    CREATININE 0.65 2023       Additional Lab/Culture results: none    PAST MEDICAL, FAMILY AND SOCIAL HISTORY UPDATE:  Past Medical History:   Diagnosis Date    Mold exposure     Thyroid cancer ([address])      Past Surgical History:   Procedure Laterality Date    BLADDER SUSPENSION       SECTION      CHOLECYSTECTOMY      HYSTERECTOMY, TOTAL ABDOMINAL (CERVIX REMOVED)  2004    KNEE SURGERY Left     TONSILLECTOMY AND ADENOIDECTOMY      TOTAL THYROIDECTOMY       Family History   Problem Relation Age of Onset    Cancer Mother         Non hodgkins lymphoma

## 2023-08-22 ENCOUNTER — TELEPHONE (OUTPATIENT)
Dept: UROLOGY | Age: 68
End: 2023-08-22

## 2023-08-22 NOTE — TELEPHONE ENCOUNTER
Cysto -local- @ [facility] 8/24/23 10:45am   *phone call* 8/22/23 8:45am         Spoke with patient, procedure info emailed.

## 2023-08-23 ENCOUNTER — HOSPITAL ENCOUNTER (OUTPATIENT)
Dept: PREADMISSION TESTING | Age: 68
Setting detail: OUTPATIENT SURGERY
End: 2023-08-23
Payer: COMMERCIAL

## 2023-08-23 VITALS — WEIGHT: 153 LBS | HEIGHT: 66 IN | BODY MASS INDEX: 24.59 KG/M2

## 2023-08-23 NOTE — PRE-PROCEDURE INSTRUCTIONS
No answer, left message ? Unable to leave message ? When were you told to arrive at hospital ? [address] [facility]     Do you have a  ? NOT NEEDED SINCE THIS IS A LOCAL PROCEDURE    Are you on any blood thinners ? NO                If yes when did you stop taking ? Do you have your prep Rx filled and instruction ? Nothing to eat the day before , only clear liquids. Are you experiencing any covid symptoms ? NO    Do you have any infections or rash we should be aware of ? NO      Do you have the Hibiclens soap to use the night before and the morning of surgery ? Nothing to eat or drink after midnight, only a sip of water to take any medication instructed to take the night before. N/A  Wear comfortable clothing, leave any valuables at home, remove any jewelry and body piercing .  YES

## 2023-08-23 NOTE — PROGRESS NOTES
Pre-op Instructions For Out-Patient  Surgery    Medication Instructions:  Please stop herbs and any supplements now (includes vitamins and minerals). N/A LOCAL   Please contact your surgeon and prescribing physician for pre-op instructions for any blood thinners. If you have inhalers/aerosol treatments at home, please use them the morning of your surgery and bring the inhalers with you to the hospital.    Please take the following medications the morning of your surgery with a sip of water:    none. Surgery Instructions:    Please shower or bathe before surgery. Please do not wear any cologne, lotion, powder, jewelry, piercings, perfume, makeup, nail polish, hair accessories, or hair spray on the day of surgery. Wear loose comfortable clothing. Leave your valuables at home but bring a payment source for any after-surgery prescriptions you plan to fill at [facility]. Bring a storage case for any glasses/contacts. The Day of Surgery:  Arrive at [facility]tr[facility] at the time directed by your surgeon and check in at the desk. If you have a living will or healthcare power of , please bring a copy. You will be taken to the pre-op holding area where you will be prepared for surgery. A physical assessment will be performed by a nurse practitioner or house officer. After surgery, you will be taken to the recovery area. You will receive instructions and be prepared for discharge. Instructions read to La and understanding verbalized.

## 2023-08-24 ENCOUNTER — HOSPITAL ENCOUNTER (OUTPATIENT)
Dept: CT IMAGING | Age: 68
Discharge: HOME | End: 2023-08-26
Attending: UROLOGY
Payer: COMMERCIAL

## 2023-08-24 ENCOUNTER — HOSPITAL ENCOUNTER (OUTPATIENT)
Age: 68
Setting detail: OUTPATIENT SURGERY
Discharge: HOME | End: 2023-08-24
Attending: UROLOGY | Admitting: UROLOGY
Payer: COMMERCIAL

## 2023-08-24 VITALS
OXYGEN SATURATION: 100 % | HEIGHT: 66 IN | DIASTOLIC BLOOD PRESSURE: 75 MMHG | SYSTOLIC BLOOD PRESSURE: 159 MMHG | TEMPERATURE: 96.8 F | WEIGHT: 153 LBS | RESPIRATION RATE: 18 BRPM | BODY MASS INDEX: 24.59 KG/M2 | HEART RATE: 62 BPM

## 2023-08-24 DIAGNOSIS — N20.0 KIDNEY STONE: ICD-10-CM

## 2023-08-24 DIAGNOSIS — R10.9 LEFT FLANK PAIN: ICD-10-CM

## 2023-08-24 PROCEDURE — 6370000000 HC RX 637 (ALT 250 FOR IP): Performed by: UROLOGY

## 2023-08-24 PROCEDURE — 3600000002 HC SURGERY LEVEL 2 BASE: Performed by: UROLOGY

## 2023-08-24 PROCEDURE — 74176 CT ABD & PELVIS W/O CONTRAST: CPT

## 2023-08-24 PROCEDURE — 7100000010 HC PHASE II RECOVERY - FIRST 15 MIN: Performed by: UROLOGY

## 2023-08-24 PROCEDURE — 7100000011 HC PHASE II RECOVERY - ADDTL 15 MIN: Performed by: UROLOGY

## 2023-08-24 PROCEDURE — 3600000012 HC SURGERY LEVEL 2 ADDTL 15MIN: Performed by: UROLOGY

## 2023-08-24 PROCEDURE — 2709999900 HC NON-CHARGEABLE SUPPLY: Performed by: UROLOGY

## 2023-08-24 RX ORDER — LIDOCAINE HYDROCHLORIDE 20 MG/ML
JELLY TOPICAL PRN
Status: DISCONTINUED | OUTPATIENT
Start: 2023-08-24 | End: 2023-08-24 | Stop reason: ALTCHOICE

## 2023-08-24 ASSESSMENT — ENCOUNTER SYMPTOMS
BACK PAIN: 1
CONSTIPATION: 1
SHORTNESS OF BREATH: 0
VOMITING: 0
NAUSEA: 0
DIARRHEA: 1
ABDOMINAL PAIN: 1

## 2023-08-24 ASSESSMENT — PAIN - FUNCTIONAL ASSESSMENT: PAIN_FUNCTIONAL_ASSESSMENT: 0-10

## 2023-08-24 ASSESSMENT — PAIN DESCRIPTION - DESCRIPTORS: DESCRIPTORS: ACHING

## 2023-08-24 NOTE — H&P
Update History & Physical    The patient's History and Physical of 8/21/2023 was reviewed with the patient and I examined the patient. There was no change. The surgical site was confirmed by the patient and me. Plan: The risks, benefits, expected outcome, and alternative to the recommended procedure have been discussed with the patient. Patient understands and wants to proceed with the procedure.      Electronically signed by Jorge Joseph MD on 8/24/2023 at 10:07 AM

## 2023-08-24 NOTE — OP NOTE
Operative Note      Patient: Angelica Dominguez  YOB: 1955  MRN: [id number]    Date of Procedure: 8/24/2023    Pre-Op Diagnosis Codes:     * Hematuria, unspecified type [R31.9]    Post-Op Diagnosis: Same       Procedure(s):  CYSTOSCOPY    Surgeon(s):  Nash Thayer MD    Assistant:   * No surgical staff found *    Anesthesia: Local    Estimated Blood Loss (mL): Minimal    Complications: None    Specimens:   * No specimens in log *    Implants:  * No implants in log *      Drains: * No LDAs found *    Findings: Small cystocele and moderate rectocele        Detailed Description of Procedure: The patient was brought to the operating room. She was properly identified. She was placed in modified dorsolithotomy position and prepped and draped in sterile fashion. She was anesthetized with 2% lidocaine jelly. I then advanced the cystoscope into the bladder. She had just voided in the preop area but her bladder had approximately 100 cc of urine suggesting that she is not emptying completely. She did have very mild trabeculation but no other abnormalities noted within the bladder. I then distended her bladder close to capacity and then pulled the scope out into the pelvic exam.  There was very mild anterior prolapse. She did have a moderate rectocele. No other abnormalities were noted on the pelvic exam.  The procedure was then terminated. The patient tolerated procedure well. She is scheduled to get a CT stone protocol later today because of her flank pain to rule out a stone. I am going to have her see Dr. Pablito Cooper at [facility] clinic for evaluation of her prolapse and voiding dysfunction. He has had a previous prolapse repair by her urogynecologist a few years ago.     Electronically signed by Nash Thayer MD on 8/24/2023 at 11:17 AM

## 2023-08-24 NOTE — H&P
SECTION      CHOLECYSTECTOMY      HYSTERECTOMY, TOTAL ABDOMINAL (CERVIX REMOVED)      KNEE SURGERY Left     arthroscopy    TONSILLECTOMY AND ADENOIDECTOMY      TOTAL THYROIDECTOMY  2002       FAMILY HISTORY       Family History   Problem Relation Age of Onset    Cancer Mother         Non hodgkins lymphoma    Stroke Father     Cancer Sister        SOCIAL HISTORY       Social History     Socioeconomic History    Marital status:    Tobacco Use    Smoking status: Never    Smokeless tobacco: Never   Vaping Use    Vaping Use: Never used   Substance and Sexual Activity    Alcohol use: Never    Drug use: Never        REVIEW OF SYSTEMS      Allergies   Allergen Reactions    Latex Rash    Shellfish Allergy Anaphylaxis     Throat and tongue swelling    Sulfa Antibiotics Rash and Shortness Of Breath    Codeine Other (See Comments)    Erythromycin Other (See Comments)    Penicillins Nausea Only       No current facility-administered medications on file prior to encounter. Current Outpatient Medications on File Prior to Encounter   Medication Sig Dispense Refill    LIOTRIX, T3-T4, PO Take 2.5 mg by mouth     Notation: Above medications are not currently reconciled at time of signing this H&P note, to be reconciled in pre-op per RN. Review of Systems   Constitutional:  Positive for chills (Intermittent). Negative for fever. HENT:  Positive for dental problem (invisalign). Negative for congestion. Eyes:  Positive for visual disturbance (glasses). Respiratory:  Negative for shortness of breath. Cardiovascular:  Positive for palpitations (Sees Dr. Johan Law and has upcoming testing). Negative for chest pain. Gastrointestinal:  Positive for abdominal pain (Upper left constant abdominal pain), constipation and diarrhea. Negative for nausea and vomiting. Genitourinary:  Positive for flank pain (Left flank pain). See HPI   Musculoskeletal:  Positive for back pain.    Skin:  Negative for for this patient.           Becky Monson, ZULEMA - CNP on 8/24/2023 at 9:46 AM

## 2023-09-01 DIAGNOSIS — R30.0 DYSURIA: Primary | ICD-10-CM

## 2023-09-01 NOTE — PROGRESS NOTES
Patient c/o lower abdominal pain, increase in urgency and frequency, getting chills but states that this is constant at night. Per Dr. Paniagua Cost protocol, urine culture ordered. Patient will get done today.

## 2023-10-05 ENCOUNTER — TELEPHONE (OUTPATIENT)
Dept: UROLOGY | Age: 68
End: 2023-10-05

## 2023-10-05 NOTE — TELEPHONE ENCOUNTER
Patient left voicemail on nurse line. Requested writer call her back. Writer has left 2 messages for patient to call back. Phone is going straight to voicemail. Will continue to try to call patient.

## 2023-10-16 ENCOUNTER — OFFICE VISIT (OUTPATIENT)
Dept: UROLOGY | Age: 68
End: 2023-10-16
Payer: COMMERCIAL

## 2023-10-16 VITALS
TEMPERATURE: 97.4 F | DIASTOLIC BLOOD PRESSURE: 99 MMHG | OXYGEN SATURATION: 97 % | WEIGHT: 152 LBS | SYSTOLIC BLOOD PRESSURE: 164 MMHG | HEIGHT: 66 IN | BODY MASS INDEX: 24.43 KG/M2 | HEART RATE: 72 BPM

## 2023-10-16 DIAGNOSIS — N81.11 CYSTOCELE, MIDLINE: ICD-10-CM

## 2023-10-16 DIAGNOSIS — R35.0 FREQUENCY OF URINATION: ICD-10-CM

## 2023-10-16 DIAGNOSIS — N20.0 KIDNEY STONE: ICD-10-CM

## 2023-10-16 DIAGNOSIS — R39.15 URGENCY OF URINATION: Primary | ICD-10-CM

## 2023-10-16 PROCEDURE — 1123F ACP DISCUSS/DSCN MKR DOCD: CPT | Performed by: UROLOGY

## 2023-10-16 PROCEDURE — 99214 OFFICE O/P EST MOD 30 MIN: CPT | Performed by: UROLOGY

## 2023-10-16 ASSESSMENT — ENCOUNTER SYMPTOMS
ABDOMINAL PAIN: 0
EYE REDNESS: 0
SHORTNESS OF BREATH: 0
DIARRHEA: 0
COUGH: 0
NAUSEA: 0
VOMITING: 0
CONSTIPATION: 0
WHEEZING: 0
EYE PAIN: 0
BACK PAIN: 0

## 2023-10-16 NOTE — PROGRESS NOTES
[address]  1847 [facility] [address]  Dept: [address] [place] [address] Urology Office Note - New Patient    Patient:  Shahida Jamison  YOB: 1955  Date: 10/16/2023    The patient is a 76 y.o. female who presentstoday for evaluation of the following problems:   Chief Complaint   Patient presents with    Other     F/u post cysto and seeing Dr. Liliana Hill    referred by Raysa Persaud    HPI  Is a very pleasant 51-year-old female who has voiding dysfunction, pelvic pain, and history of stones. She does have a history of pelvic reconstruction by urogynecologist and is seeing Dr. Liliana Hill at [facility] clinic to address complications related to this. She does have a known nonobstructing left renal stone. She has had stones before and is having quite a bit of discomfort in the left kidney. She is scheduled to get a cystoscopy next month at [facility] clinic regarding her prolapse. (Patient's old records have been requested, reviewed and summarized in today's note.)    Summary of old records: N/A    History: N/A    ProceduresToday: N/A    Urinalysis today:  No results found for this visit on 10/16/23. AUA Symptom Score (10/16/2023):                                Last BUN andcreatinine:  Lab Results   Component Value Date    BUN 17 2023     Lab Results   Component Value Date    CREATININE 0.65 2023       Additional Lab/Culture results: none    Reviewed during this Office Visit: none  (results were independently reviewed byphysician and radiology report verified)    PAST MEDICAL, FAMILY AND SOCIAL HISTORY:  Past Medical History:   Diagnosis Date    Mold exposure     PONV (postoperative nausea and vomiting)     Thyroid cancer [facility])      Past Surgical History:   Procedure Laterality Date    BLADDER SUSPENSION       SECTION      CHOLECYSTECTOMY      CYSTOSCOPY N/A 2023

## 2024-01-12 ENCOUNTER — TELEPHONE (OUTPATIENT)
Dept: UROLOGY | Age: 69
End: 2024-01-12

## 2024-01-21 ENCOUNTER — HOSPITAL ENCOUNTER (OUTPATIENT)
Dept: GENERAL RADIOLOGY | Age: 69
Discharge: HOME | End: 2024-01-23
Payer: COMMERCIAL

## 2024-01-21 ENCOUNTER — HOSPITAL ENCOUNTER (OUTPATIENT)
Age: 69
Discharge: HOME | End: 2024-01-23
Payer: COMMERCIAL

## 2024-01-21 DIAGNOSIS — N20.0 KIDNEY STONE: ICD-10-CM

## 2024-01-21 PROCEDURE — 74018 RADEX ABDOMEN 1 VIEW: CPT

## 2024-01-29 ENCOUNTER — OFFICE VISIT (OUTPATIENT)
Dept: UROLOGY | Age: 69
End: 2024-01-29
Payer: COMMERCIAL

## 2024-01-29 VITALS — TEMPERATURE: 96.8 F | HEIGHT: 66 IN | WEIGHT: 152 LBS | BODY MASS INDEX: 24.43 KG/M2

## 2024-01-29 DIAGNOSIS — R35.0 FREQUENCY OF URINATION: ICD-10-CM

## 2024-01-29 DIAGNOSIS — R39.15 URGENCY OF URINATION: Primary | ICD-10-CM

## 2024-01-29 DIAGNOSIS — N20.0 KIDNEY STONE: ICD-10-CM

## 2024-01-29 PROCEDURE — 99214 OFFICE O/P EST MOD 30 MIN: CPT | Performed by: UROLOGY

## 2024-01-29 PROCEDURE — 1123F ACP DISCUSS/DSCN MKR DOCD: CPT | Performed by: UROLOGY

## 2024-01-29 ASSESSMENT — ENCOUNTER SYMPTOMS
RESPIRATORY NEGATIVE: 1
BACK PAIN: 0
EYES NEGATIVE: 1
VOMITING: 0
COUGH: 0
DIARRHEA: 0
SHORTNESS OF BREATH: 0
CONSTIPATION: 0
WHEEZING: 0
EYE PAIN: 0
GASTROINTESTINAL NEGATIVE: 1
NAUSEA: 0
ABDOMINAL PAIN: 0
EYE REDNESS: 0

## 2024-01-29 NOTE — PROGRESS NOTES
Review of Systems   Constitutional: Negative.  Negative for appetite change, chills and fever.   Eyes: Negative.  Negative for pain, redness and visual disturbance.   Respiratory: Negative.  Negative for cough, shortness of breath and wheezing.    Cardiovascular: Negative.  Negative for chest pain and leg swelling.   Gastrointestinal: Negative.  Negative for abdominal pain, constipation, diarrhea, nausea and vomiting.   Genitourinary:  Negative for difficulty urinating, dysuria, flank pain, frequency, hematuria and urgency.   Musculoskeletal: Negative.  Negative for back pain, joint swelling and myalgias.   Skin: Negative.  Negative for rash and wound.   Neurological:  Negative for dizziness, tremors and numbness.   Hematological: Negative.  Does not bruise/bleed easily.

## 2024-01-29 NOTE — PROGRESS NOTES
[facility]YS[facility]ANS [facility]  [address]  [facility] [address]  Dept: [phone]    [facility] Urology Office Note - Established    Patient:  La Jamison  YOB: 1955  Date: 2024    The patient is a 68 y.o. female whopresents today for evaluation of the following problems:   Chief Complaint   Patient presents with    Urinary Urgency     3 month f/u with [facility]       HPI  This is a very pleasant 68-year-old female who has a history of voiding dysfunction and stones.  Although her [facility] x-ray does not show any stones, she is interested in doing a metabolic evaluation.  She has been seeing Dr. Ho at [facility].  He has offered her a variety of options and she is still undecided about what she wishes to do.  She is apprehensive about starting any medications for urgency.    Summary of old records: N/A    Additional History: N/A    Procedures Today: N/A    Urinalysis today:  No results found for this visit on 24.    Imaging Reviewed during this Office Visit: none  (results were independently reviewed by physician and radiology report verified)    AUA Symptom Score (2024):                               Last BUN and creatinine:  Lab Results   Component Value Date    BUN 17 2023     Lab Results   Component Value Date    CREATININE 0.65 2023       Additional Lab/Culture results: none    PAST MEDICAL, FAMILY AND SOCIAL HISTORY UPDATE:  Past Medical History:   Diagnosis Date    Mold exposure     PONV (postoperative nausea and vomiting)     Thyroid cancer (HCC)      Past Surgical History:   Procedure Laterality Date    BLADDER SUSPENSION  2008     SECTION      CHOLECYSTECTOMY      CYSTOSCOPY N/A 2023    CYSTOSCOPY performed by Winston Betancourt MD at [facility] OR    HYSTERECTOMY, TOTAL ABDOMINAL (CERVIX REMOVED)  2004    KNEE SURGERY Left     arthroscopy    TONSILLECTOMY AND ADENOIDECTOMY

## 2024-03-11 ENCOUNTER — OFFICE VISIT (OUTPATIENT)
Dept: UROLOGY | Age: 69
End: 2024-03-11
Payer: COMMERCIAL

## 2024-03-11 VITALS
SYSTOLIC BLOOD PRESSURE: 134 MMHG | BODY MASS INDEX: 25.88 KG/M2 | TEMPERATURE: 97.8 F | OXYGEN SATURATION: 98 % | HEART RATE: 70 BPM | DIASTOLIC BLOOD PRESSURE: 82 MMHG | HEIGHT: 66 IN | WEIGHT: 161 LBS

## 2024-03-11 DIAGNOSIS — N20.0 KIDNEY STONE: Primary | ICD-10-CM

## 2024-03-11 DIAGNOSIS — R82.991 HYPOCITRATURIA: ICD-10-CM

## 2024-03-11 DIAGNOSIS — R82.994 HYPERCALCIURIA: ICD-10-CM

## 2024-03-11 PROCEDURE — 99213 OFFICE O/P EST LOW 20 MIN: CPT | Performed by: UROLOGY

## 2024-03-11 PROCEDURE — 1123F ACP DISCUSS/DSCN MKR DOCD: CPT | Performed by: UROLOGY

## 2024-03-11 ASSESSMENT — ENCOUNTER SYMPTOMS
SHORTNESS OF BREATH: 1
ALLERGIC/IMMUNOLOGIC NEGATIVE: 1
NAUSEA: 0
COUGH: 0
BACK PAIN: 1
EYES NEGATIVE: 1
VOMITING: 0
EYE PAIN: 0
ABDOMINAL PAIN: 0
WHEEZING: 0
EYE REDNESS: 0
GASTROINTESTINAL NEGATIVE: 1

## 2024-03-11 NOTE — PROGRESS NOTES
[facility] PHYSICIANS [facility] UROLOGY CENTER  [address]  [facility] [address]  Dept: [phone]    [facility] Urology Office Note - Established    Patient:  La Jamison  YOB: 1955  Date: 3/11/2024    The patient is a 68 y.o. female whopresents today for evaluation of the following problems:   Chief Complaint   Patient presents with    Results     6 week follow up       HPI  Is a very pleasant 68-year-old female who has a history of voiding dysfunction and stones.  She has had reconstructive surgery by urogynecologist several years ago.  She saw Dr. Ho at [facility] for second opinion about her sling.  Her urinary symptoms currently are somewhat bothersome but manageable.  She is seeing us back today because she had a 24-hour urine done.  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  These numbers are just borderline abnormal.    Summary of old records: N/A    Additional History: N/A    Procedures Today: N/A    Urinalysis today:  No results found for this visit on 03/11/24.    Imaging Reviewed during this Office Visit: none  (results were independently reviewed by physician and radiology report verified)    AUA Symptom Score (3/11/2024):  INCOMPLETE EMPTYING: How often have you had the sensation of not emptying your bladder?: Not at all  FREQUENCY: How often do you have to urinate less than every two hours?: About Half the time  INTERMITTENCY: How often have you found you stopped and started again several times when you urinated?: Not at all  URGENCY: How often have you found it difficult to postpone urination?: Less than Half the time  WEAK STREAM: How often have you had a weak urinary stream?: Less than Half the time  STRAINING: How often have you had to strain to start  urination?: Not at all  NOCTURIA: How many times did you typically get up at night to uriniate?: 1 Time  TOTAL I-PSS SCORE:: 8       Last BUN

## 2024-03-11 NOTE — PROGRESS NOTES
Review of Systems   Constitutional: Negative.  Negative for activity change, chills and fever.   HENT: Negative.     Eyes: Negative.  Negative for pain, redness and visual disturbance.   Respiratory:  Positive for shortness of breath. Negative for cough and wheezing.    Cardiovascular: Negative.  Negative for chest pain and leg swelling.   Gastrointestinal: Negative.  Negative for abdominal pain, nausea and vomiting.   Endocrine: Negative.    Genitourinary:  Positive for frequency. Negative for difficulty urinating, dysuria, enuresis, flank pain, hematuria and urgency.   Musculoskeletal:  Positive for back pain. Negative for joint swelling and myalgias.   Skin: Negative.  Negative for rash and wound.   Allergic/Immunologic: Negative.    Neurological: Negative.  Negative for dizziness, tremors and numbness.   Hematological: Negative.  Does not bruise/bleed easily.   Psychiatric/Behavioral: Negative.

## 2024-05-08 ENCOUNTER — TELEPHONE (OUTPATIENT)
Dept: UROLOGY | Age: 69
End: 2024-05-08

## 2024-05-08 NOTE — TELEPHONE ENCOUNTER
Patient called in and left a voicemail wanting to know if she could get her [facility] results. Writer called patient back and was unable to leave a message. Writer was going to inform patient to call medical records to obtain those results.

## 2024-06-20 ENCOUNTER — TRANSCRIBE ORDERS (OUTPATIENT)
Dept: ADMINISTRATIVE | Age: 69
End: 2024-06-20

## 2024-06-20 DIAGNOSIS — I82.4Y2 DEEP VEIN THROMBOSIS (DVT) OF PROXIMAL VEIN OF LEFT LOWER EXTREMITY, UNSPECIFIED CHRONICITY (HCC): Primary | ICD-10-CM

## 2024-06-21 ENCOUNTER — HOSPITAL ENCOUNTER (OUTPATIENT)
Dept: VASCULAR LAB | Age: 69
Discharge: HOME | End: 2024-06-21
Attending: EMERGENCY MEDICINE
Payer: COMMERCIAL

## 2024-06-21 DIAGNOSIS — I82.4Y2 DEEP VEIN THROMBOSIS (DVT) OF PROXIMAL VEIN OF LEFT LOWER EXTREMITY, UNSPECIFIED CHRONICITY (HCC): ICD-10-CM

## 2024-06-21 PROCEDURE — 93971 EXTREMITY STUDY: CPT

## 2024-06-22 PROCEDURE — 93971 EXTREMITY STUDY: CPT | Performed by: STUDENT IN AN ORGANIZED HEALTH CARE EDUCATION/TRAINING PROGRAM

## 2024-09-04 ENCOUNTER — TELEPHONE (OUTPATIENT)
Dept: UROLOGY | Age: 69
End: 2024-09-04

## 2024-10-19 ENCOUNTER — HOSPITAL ENCOUNTER (EMERGENCY)
Age: 69
Discharge: HOME | End: 2024-10-19
Attending: EMERGENCY MEDICINE
Payer: COMMERCIAL

## 2024-10-19 VITALS
HEART RATE: 72 BPM | TEMPERATURE: 98.9 F | WEIGHT: 158 LBS | HEIGHT: 66 IN | BODY MASS INDEX: 25.39 KG/M2 | DIASTOLIC BLOOD PRESSURE: 80 MMHG | OXYGEN SATURATION: 100 % | SYSTOLIC BLOOD PRESSURE: 167 MMHG | RESPIRATION RATE: 18 BRPM

## 2024-10-19 DIAGNOSIS — H11.32 SUBCONJUNCTIVAL HEMORRHAGE OF LEFT EYE: Primary | ICD-10-CM

## 2024-10-19 DIAGNOSIS — R22.42 LOCALIZED SWELLING OF LEFT LOWER EXTREMITY: ICD-10-CM

## 2024-10-19 LAB
EKG ATRIAL RATE: 73 BPM
EKG P AXIS: 63 DEGREES
EKG P-R INTERVAL: 152 MS
EKG Q-T INTERVAL: 406 MS
EKG QRS DURATION: 80 MS
EKG QTC CALCULATION (BAZETT): 447 MS
EKG R AXIS: -5 DEGREES
EKG T AXIS: 32 DEGREES
EKG VENTRICULAR RATE: 73 BPM

## 2024-10-19 PROCEDURE — 93005 ELECTROCARDIOGRAM TRACING: CPT | Performed by: EMERGENCY MEDICINE

## 2024-10-19 PROCEDURE — 99283 EMERGENCY DEPT VISIT LOW MDM: CPT

## 2024-10-19 PROCEDURE — 93010 ELECTROCARDIOGRAM REPORT: CPT | Performed by: INTERNAL MEDICINE

## 2024-10-19 ASSESSMENT — VISUAL ACUITY
OD: 20/25
OS: 20/30
OU: 20/25

## 2024-10-19 ASSESSMENT — PAIN DESCRIPTION - LOCATION: LOCATION: EYE

## 2024-10-19 ASSESSMENT — PAIN SCALES - GENERAL: PAINLEVEL_OUTOF10: 5

## 2024-10-19 ASSESSMENT — PAIN - FUNCTIONAL ASSESSMENT: PAIN_FUNCTIONAL_ASSESSMENT: 0-10

## 2024-10-19 NOTE — DISCHARGE INSTRUCTIONS
Take your medication as indicated and prescribed.    For pain use ibuprofen (Motrin / Advil) or acetaminophen (Tylenol), unless prescribed medications that have acetaminophen in it.  You can take over the counter acetaminophen tablets (1 - 2 tablets of the 500-mg strength every 6 hours) or ibuprofen tablets (2 tablets every 4 hours).      PLEASE RETURN TO THE EMERGENCY DEPARTMENT IMMEDIATELY for worsening symptoms, swelling or drainage from the eye, the white of your eye turns red, inability to see, or if you develop any concerning symptoms such as: high fever not relieved by acetaminophen (Tylenol) and/or ibuprofen (Motrin / Advil), chills, shortness of breath, chest pain, feeling of your heart fluttering or racing, persistent nausea and/or vomiting, vomiting up blood, blood in your stool, numbness, loss of consciousness, weakness or tingling in the arms or legs or change in color of the extremities, changes in mental status, persistent headache, blurry vision, loss of bladder / bowel control, unable to follow up with your physician, or other any other care or concern.

## 2024-10-19 NOTE — ED PROVIDER NOTES
[facility] Emergency Department  [address]  [facility] [address]  Phone: [phone]  Fax: [phone]      Pt Name: La Jamison  MRN:[id number]  Birthdate 1955  Date of evaluation: 10/19/2024      CHIEF COMPLAINT       Chief Complaint   Patient presents with    Eye Pain     Pt. States she notice blood vessels \"rola\" in her left eye tonight while washing her face after feeling dizziness and pressure in the back of her head     Leg Swelling     LLE redness, swelling        HISTORY OF PRESENT ILLNESS   La Jamison is a 69 y.o. female who presents for evaluation of ruptured blood vessel to her left eye.  The patient reports that just prior to arrival she was washing her face when she developed redness to the medial portion of her left eye.  She states that this started after she got some soap in her eye and was rubbing at the area.  The patient states that she subsequently called EMS to bring her to the hospital.  She has not taken any medications for symptoms and does not list any provoking or palliating factors.  The patient wears glasses but no contacts.  She denies any eye surgery.  The patient states that she was seen by her ophthalmologist recently for intermittent floaters and was referred to a specialist for suspected vitreous detachment.  The patient is not having any floaters or vision changes tonight.  She has not followed up with the eye specialist as recommended.  The patient also reports that for months she has had swelling and redness to her left ankle.  She has been seen by her PCP, vascular surgery and dermatology.  She has had a negative venous Doppler scan and normal blood work.  The patient states that her dermatologist plans to biopsy the ankle.  The patient has had a chronic rash to her hands and face with similar appearance.  Biopsy of the face showed skin zeke and possible mycobacterial infection.  The patient was prescribed tacrolimus  finger-nose.  PSYCHIATRIC: normal mood and affect, thought process is clear and linear    DIAGNOSTIC RESULTS     EKG:  EKG 4:24 AM, sinus rhythm, rate 73 bpm, normal axis, normal intervals, no ST elevation or depression, no T wave inversions, good R wave progression, no pathologic Q waves, no previous EKG for comparison    RADIOLOGY:   No results found.    LABS:  No results found for this visit on 10/19/24.    EMERGENCY DEPARTMENT COURSE:        The patient was given the following medications:  No orders of the defined types were placed in this encounter.       Vitals:    Vitals:    10/19/24 0426 10/19/24 0430   BP: (!) 167/80    Pulse: 72    Resp: 18    Temp:  98.9 °F (37.2 °C)   TempSrc:  Oral   SpO2: 100%    Weight: 71.7 kg (158 lb)    Height: 1.676 m (5' 6\")      -------------------------  BP: (!) 167/80, Temp: 98.9 °F (37.2 °C), Pulse: 72, Respirations: 18    CONSULTS:  None    CRITICAL CARE:   None    PROCEDURES:  None    DIAGNOSIS/ MDM:   La Jamison is a 69 y.o. female who presents with redness to her left medial eye.  She ruptured a blood vessel while washing her face and rubbing at her left eye where she had gotten soap in the eye.  She arrives by EMS.  Vital signs are stable except for slightly elevated blood pressure.  No focal neurologic deficits.  No signs of entrapment or eye infection.  Normal visual fields and visual acuity.  She complains of a mild headache but declines any treatment for this.  EKG shows sinus rhythm without any ST changes. No ECG findings of dysrhythmias, ischemic changes, prolonged QT interval, Delta wave, Brugada pattern, or Epsilon wave.  The patient has several chronic conditions for which she sees endocrinology, ophthalmology, vascular surgery, and dermatology.  She requested my opinion on several of her recent biopsies and treatments.  She largely refuses to take medications that have been prescribed for her chronic conditions.  I explained that my area of

## 2025-07-02 ENCOUNTER — HOSPITAL ENCOUNTER (EMERGENCY)
Age: 70
Discharge: HOME | End: 2025-07-02
Attending: EMERGENCY MEDICINE
Payer: COMMERCIAL

## 2025-07-02 VITALS
DIASTOLIC BLOOD PRESSURE: 49 MMHG | HEART RATE: 71 BPM | TEMPERATURE: 98.1 F | SYSTOLIC BLOOD PRESSURE: 127 MMHG | BODY MASS INDEX: 25.71 KG/M2 | OXYGEN SATURATION: 98 % | HEIGHT: 66 IN | WEIGHT: 160 LBS | RESPIRATION RATE: 16 BRPM

## 2025-07-02 DIAGNOSIS — R21 RASH AND OTHER NONSPECIFIC SKIN ERUPTION: Primary | ICD-10-CM

## 2025-07-02 PROCEDURE — 99283 EMERGENCY DEPT VISIT LOW MDM: CPT

## 2025-07-02 RX ORDER — DOXYCYCLINE HYCLATE 100 MG
100 TABLET ORAL 2 TIMES DAILY
Qty: 14 TABLET | Refills: 0 | Status: SHIPPED | OUTPATIENT
Start: 2025-07-02 | End: 2025-07-09

## 2025-07-02 RX ORDER — ONDANSETRON 4 MG/1
4 TABLET, ORALLY DISINTEGRATING ORAL 3 TIMES DAILY PRN
Qty: 21 TABLET | Refills: 0 | Status: SHIPPED | OUTPATIENT
Start: 2025-07-02

## 2025-07-02 RX ORDER — FLUCONAZOLE 150 MG/1
150 TABLET ORAL ONCE
Qty: 1 TABLET | Refills: 0 | Status: SHIPPED | OUTPATIENT
Start: 2025-07-02 | End: 2025-07-02

## 2025-07-02 ASSESSMENT — PAIN DESCRIPTION - ORIENTATION: ORIENTATION: LEFT

## 2025-07-02 ASSESSMENT — PAIN - FUNCTIONAL ASSESSMENT: PAIN_FUNCTIONAL_ASSESSMENT: 0-10

## 2025-07-02 ASSESSMENT — PAIN DESCRIPTION - LOCATION: LOCATION: ANKLE

## 2025-07-02 ASSESSMENT — PAIN SCALES - GENERAL: PAINLEVEL_OUTOF10: 5

## 2025-07-03 NOTE — ED PROVIDER NOTES
[facility] EMERGENCY DEPARTMENT  eMERGENCY dEPARTMENT eNCOUnter   Independent Attestation     Pt Name: La Lewis  MRN: [id number]  Birthdate 1955  Date of evaluation: 7/2/25       La Lewis is a 69 y.o. female who presents with Rash (Pt arrives with co worsening of rash on left ankle. Pt states she has been evaluated and on medications per her dermatologist. Pt states she has now noted her lymph nodes under her armpits and groin to be swollen.)        Based on the medical record, the care appears appropriate. I was personally available for consultation in the Emergency Department.    Lilian Smith DO  Attending Emergency  Physician               Lilian Smith DO  07/02/25 [id number]

## 2025-07-03 NOTE — DISCHARGE INSTRUCTIONS
As we discussed I would advise that you use the steroids cream that was previously prescribed by your dermatologist.  Will provide you with a course of doxycycline to use if you feel that your symptoms are worsening.    If you notice red streaking going up the leg, foul-smelling drainage coming from the wounds, persistent fevers, chills, nausea, vomiting, fatigue or any other new cares or concerns I would advise that you return for reevaluation.

## 2025-07-03 NOTE — ED PROVIDER NOTES
[facility] Emergency Department  [address]  [facility] [address]  Phone: [phone]  Fax: [phone]      Patient Name:  La Lewis  Medical Record Number:  [id number]  YOB: 1955  Date of Service:  7/2/2025  Primary Care Physician:  La Medina MD      CHIEF COMPLAINT:       Chief Complaint   Patient presents with    Rash     Pt arrives with co worsening of rash on left ankle. Pt states she has been evaluated and on medications per her dermatologist. Pt states she has now noted her lymph nodes under her armpits and groin to be swollen.       HISTORY OF PRESENT ILLNESS:    La Lewis is a 69 y.o. female who presents with the complaint of a chronic rash of the left ankle.  Patient has been under the care of 2 different dermatologist for this, most recently was on doxycycline and prescribed a steroid cream to apply by the dermatologist.  She reports that she did take the doxycycline however did not use a steroid cream as she was concerned to use it on open areas of skin breakdown because she read on the Internet that steroid cream should be avoided with open skin.    She reports that indeed the rash is actually better than it has been in the past but she is concerned that there may be some sort of chronic infection.  She denies any systemic symptoms such as fevers, chills, nausea, vomiting, fatigue, malaise she has not noticed any streaking of redness up the left lower extremity although does note today that she felt that her lymph nodes of the bilateral axilla and left groin were swollen and a little bit tender.    She has also been evaluated by vascular surgery who felt that this was a chronic venous stasis rash and have recommended the patient go undergo intervention for this to help relieve her symptoms, she has so far declined to do so per their notes she states that is because she has this rash and they wanted to be cleared up prior to  voice recognition program.  Efforts were made to edit the dictations but occasionally words are mis-transcribed.)       Krishan Garcia, ZULEMA - CNP  07/02/25 2041

## 2025-07-11 ENCOUNTER — HOSPITAL ENCOUNTER (OUTPATIENT)
Age: 70
Discharge: HOME | End: 2025-07-11
Payer: COMMERCIAL

## 2025-07-11 LAB — D DIMER PPP FEU-MCNC: 0.39 UG/ML FEU (ref 0–0.59)

## 2025-07-11 PROCEDURE — 36415 COLL VENOUS BLD VENIPUNCTURE: CPT

## 2025-07-11 PROCEDURE — 85379 FIBRIN DEGRADATION QUANT: CPT

## 2025-08-13 ENCOUNTER — OFFICE VISIT (OUTPATIENT)
Dept: INFECTIOUS DISEASES | Age: 70
End: 2025-08-13
Payer: COMMERCIAL

## 2025-08-13 ENCOUNTER — TELEPHONE (OUTPATIENT)
Age: 70
End: 2025-08-13

## 2025-08-13 VITALS
DIASTOLIC BLOOD PRESSURE: 75 MMHG | OXYGEN SATURATION: 97 % | BODY MASS INDEX: 25.88 KG/M2 | HEART RATE: 70 BPM | HEIGHT: 66 IN | WEIGHT: 161 LBS | SYSTOLIC BLOOD PRESSURE: 126 MMHG

## 2025-08-13 DIAGNOSIS — L30.9 DERMATITIS: Primary | ICD-10-CM

## 2025-08-13 PROCEDURE — 1123F ACP DISCUSS/DSCN MKR DOCD: CPT | Performed by: INTERNAL MEDICINE

## 2025-08-13 PROCEDURE — 99204 OFFICE O/P NEW MOD 45 MIN: CPT | Performed by: INTERNAL MEDICINE

## 2025-08-13 RX ORDER — VIT C/B6/B5/MAGNESIUM/HERB 173 50-5-6-5MG
CAPSULE ORAL
COMMUNITY

## 2025-08-13 RX ORDER — MULTIVITAMIN WITH IRON
TABLET ORAL DAILY
COMMUNITY

## 2025-08-13 RX ORDER — SACCHAROMYCES BOULARDII 250 MG
CAPSULE ORAL
COMMUNITY

## 2025-08-13 RX ORDER — CHOLECALCIFEROL (VITAMIN D3) 10(400)/ML
DROPS ORAL
COMMUNITY

## 2025-08-13 RX ORDER — LEVOTHYROXINE SODIUM 125 UG/1
125 CAPSULE ORAL
COMMUNITY

## (undated) DEVICE — SOLUTION IRRIGATION STRL H2O 1000 ML UROMATIC CONTAINER

## (undated) DEVICE — ST CHARLES CYSTO PACK: Brand: MEDLINE INDUSTRIES, INC.

## (undated) DEVICE — GLOVE ORANGE PI 7 1/2   MSG9075

## (undated) DEVICE — SOLUTION IRRIG 1000ML STRL H2O USP PLAS POUR BTL